# Patient Record
Sex: FEMALE | Race: WHITE | Employment: OTHER | URBAN - METROPOLITAN AREA
[De-identification: names, ages, dates, MRNs, and addresses within clinical notes are randomized per-mention and may not be internally consistent; named-entity substitution may affect disease eponyms.]

---

## 2017-01-01 ENCOUNTER — OFFICE VISIT (OUTPATIENT)
Dept: FAMILY MEDICINE CLINIC | Age: 82
End: 2017-01-01

## 2017-01-01 ENCOUNTER — TELEPHONE (OUTPATIENT)
Dept: FAMILY MEDICINE CLINIC | Age: 82
End: 2017-01-01

## 2017-01-01 VITALS
HEART RATE: 90 BPM | SYSTOLIC BLOOD PRESSURE: 108 MMHG | WEIGHT: 150 LBS | TEMPERATURE: 97.5 F | BODY MASS INDEX: 22.73 KG/M2 | HEIGHT: 68 IN | DIASTOLIC BLOOD PRESSURE: 68 MMHG

## 2017-01-01 VITALS
RESPIRATION RATE: 16 BRPM | DIASTOLIC BLOOD PRESSURE: 78 MMHG | HEIGHT: 68 IN | SYSTOLIC BLOOD PRESSURE: 132 MMHG | TEMPERATURE: 97.5 F | HEART RATE: 72 BPM | BODY MASS INDEX: 23.64 KG/M2 | WEIGHT: 156 LBS

## 2017-01-01 VITALS
SYSTOLIC BLOOD PRESSURE: 108 MMHG | DIASTOLIC BLOOD PRESSURE: 68 MMHG | HEIGHT: 68 IN | TEMPERATURE: 97.5 F | HEART RATE: 68 BPM

## 2017-01-01 DIAGNOSIS — Z23 INFLUENZA VACCINE NEEDED: ICD-10-CM

## 2017-01-01 DIAGNOSIS — Z98.890 HX OF CRANIOTOMY: Primary | ICD-10-CM

## 2017-01-01 DIAGNOSIS — Z09 HOSPITAL DISCHARGE FOLLOW-UP: ICD-10-CM

## 2017-01-01 DIAGNOSIS — L98.9 SCALP LESION: ICD-10-CM

## 2017-01-01 DIAGNOSIS — Z98.890 HX OF CRANIOTOMY: ICD-10-CM

## 2017-01-01 DIAGNOSIS — E78.5 HYPERLIPIDEMIA: ICD-10-CM

## 2017-01-01 DIAGNOSIS — R33.9 URINARY RETENTION: Primary | ICD-10-CM

## 2017-01-01 DIAGNOSIS — L98.9 SCALP LESION: Primary | ICD-10-CM

## 2017-01-01 DIAGNOSIS — Z23 NEED FOR PNEUMOCOCCAL VACCINATION: ICD-10-CM

## 2017-01-01 DIAGNOSIS — I48.19 PERSISTENT ATRIAL FIBRILLATION (HCC): ICD-10-CM

## 2017-01-01 DIAGNOSIS — I10 BENIGN ESSENTIAL HTN: ICD-10-CM

## 2017-01-01 LAB
ANAEROBIC CULTURE: ABNORMAL
BILIRUBIN, POC: NORMAL
BLOOD URINE, POC: 25
CLARITY, POC: NORMAL
COLOR, POC: NORMAL
GLUCOSE URINE, POC: NORMAL
GRAM STAIN RESULT: ABNORMAL
KETONES, POC: NORMAL
LEUKOCYTE EST, POC: 125
NITRITE, POC: NORMAL
ORGANISM: ABNORMAL
PH, POC: 6
PROTEIN, POC: 15
SPECIFIC GRAVITY, POC: 1.02
UROBILINOGEN, POC: 0.2
WOUND/ABSCESS: ABNORMAL

## 2017-01-01 PROCEDURE — 81003 URINALYSIS AUTO W/O SCOPE: CPT | Performed by: FAMILY MEDICINE

## 2017-01-01 PROCEDURE — 99213 OFFICE O/P EST LOW 20 MIN: CPT | Performed by: FAMILY MEDICINE

## 2017-01-01 PROCEDURE — 90662 IIV NO PRSV INCREASED AG IM: CPT | Performed by: FAMILY MEDICINE

## 2017-01-01 PROCEDURE — 99214 OFFICE O/P EST MOD 30 MIN: CPT | Performed by: FAMILY MEDICINE

## 2017-01-01 PROCEDURE — G0008 ADMIN INFLUENZA VIRUS VAC: HCPCS | Performed by: FAMILY MEDICINE

## 2017-01-01 RX ORDER — LEVOTHYROXINE SODIUM 0.1 MG/1
TABLET ORAL
Qty: 90 TABLET | Refills: 1 | Status: SHIPPED | OUTPATIENT
Start: 2017-01-01

## 2017-01-01 RX ORDER — LEVOTHYROXINE SODIUM 0.07 MG/1
75 TABLET ORAL DAILY
Qty: 90 TABLET | Refills: 3 | Status: SHIPPED | OUTPATIENT
Start: 2017-01-01 | End: 2018-01-01 | Stop reason: SDUPTHER

## 2017-01-01 RX ORDER — SIMVASTATIN 20 MG
TABLET ORAL
Qty: 90 TABLET | Refills: 1 | Status: SHIPPED | OUTPATIENT
Start: 2017-01-01 | End: 2018-01-01 | Stop reason: SDUPTHER

## 2017-01-01 RX ORDER — METOPROLOL TARTRATE 50 MG/1
TABLET, FILM COATED ORAL
Qty: 180 TABLET | Refills: 1 | Status: SHIPPED | OUTPATIENT
Start: 2017-01-01 | End: 2018-01-01 | Stop reason: SDUPTHER

## 2017-01-01 RX ORDER — LEVOTHYROXINE SODIUM 0.07 MG/1
75 TABLET ORAL DAILY
Qty: 30 TABLET | Refills: 3 | Status: SHIPPED | OUTPATIENT
Start: 2017-01-01 | End: 2017-01-01 | Stop reason: SDUPTHER

## 2017-01-01 RX ORDER — LEVETIRACETAM 500 MG/1
TABLET ORAL
Qty: 180 TABLET | Refills: 1 | Status: SHIPPED | OUTPATIENT
Start: 2017-01-01 | End: 2018-01-01 | Stop reason: SDUPTHER

## 2017-01-01 RX ORDER — MIRTAZAPINE 15 MG/1
TABLET, FILM COATED ORAL
Qty: 90 TABLET | Refills: 1 | Status: SHIPPED | OUTPATIENT
Start: 2017-01-01 | End: 2018-01-01 | Stop reason: SDUPTHER

## 2017-01-01 RX ORDER — MIRTAZAPINE 15 MG/1
TABLET, FILM COATED ORAL
Qty: 90 TABLET | Refills: 0 | Status: SHIPPED | OUTPATIENT
Start: 2017-01-01 | End: 2018-01-01 | Stop reason: SDUPTHER

## 2017-01-01 RX ORDER — FUROSEMIDE 20 MG/1
TABLET ORAL
Qty: 360 TABLET | Refills: 0 | Status: SHIPPED | OUTPATIENT
Start: 2017-01-01 | End: 2018-01-01 | Stop reason: SDUPTHER

## 2017-01-01 RX ORDER — SULFAMETHOXAZOLE AND TRIMETHOPRIM 800; 160 MG/1; MG/1
1 TABLET ORAL 2 TIMES DAILY
Qty: 20 TABLET | Refills: 0 | Status: SHIPPED | OUTPATIENT
Start: 2017-01-01 | End: 2017-01-01

## 2017-01-01 ASSESSMENT — ENCOUNTER SYMPTOMS
SORE THROAT: 0
SINUS PRESSURE: 0
CONSTIPATION: 0
CONSTIPATION: 0
DIARRHEA: 0
SHORTNESS OF BREATH: 0
EYE DISCHARGE: 0
DIARRHEA: 0
SINUS PRESSURE: 0
DIARRHEA: 0
ABDOMINAL PAIN: 0
SORE THROAT: 0
SHORTNESS OF BREATH: 1
SINUS PRESSURE: 0
EYE DISCHARGE: 0
EYE ITCHING: 0
CONSTIPATION: 0
EYE ITCHING: 0
EYE ITCHING: 0
SHORTNESS OF BREATH: 1
SORE THROAT: 0
COUGH: 0
EYE DISCHARGE: 0

## 2017-01-24 RX ORDER — MIRTAZAPINE 15 MG/1
15 TABLET, FILM COATED ORAL NIGHTLY
Qty: 90 TABLET | Refills: 0 | Status: SHIPPED | OUTPATIENT
Start: 2017-01-24 | End: 2017-05-01 | Stop reason: SDUPTHER

## 2017-03-05 DIAGNOSIS — I10 BENIGN ESSENTIAL HTN: ICD-10-CM

## 2017-03-06 RX ORDER — POTASSIUM CHLORIDE 750 MG/1
TABLET, FILM COATED, EXTENDED RELEASE ORAL
Qty: 180 TABLET | Refills: 0 | Status: SHIPPED | OUTPATIENT
Start: 2017-03-06 | End: 2017-06-04 | Stop reason: SDUPTHER

## 2017-03-07 RX ORDER — LEVETIRACETAM 500 MG/1
500 TABLET ORAL 2 TIMES DAILY
Qty: 60 TABLET | Refills: 1 | Status: SHIPPED | OUTPATIENT
Start: 2017-03-07 | End: 2017-05-02 | Stop reason: SDUPTHER

## 2017-05-01 RX ORDER — MIRTAZAPINE 15 MG/1
TABLET, FILM COATED ORAL
Qty: 90 TABLET | Refills: 1 | Status: SHIPPED | OUTPATIENT
Start: 2017-05-01

## 2017-05-02 RX ORDER — LEVETIRACETAM 500 MG/1
TABLET ORAL
Qty: 60 TABLET | Refills: 2 | Status: SHIPPED | OUTPATIENT
Start: 2017-05-02

## 2017-05-25 RX ORDER — LEVOTHYROXINE SODIUM 0.1 MG/1
TABLET ORAL
Qty: 90 TABLET | Refills: 0 | Status: SHIPPED | OUTPATIENT
Start: 2017-05-25 | End: 2017-07-24

## 2017-06-04 DIAGNOSIS — I10 BENIGN ESSENTIAL HTN: ICD-10-CM

## 2017-06-05 RX ORDER — POTASSIUM CHLORIDE 750 MG/1
TABLET, FILM COATED, EXTENDED RELEASE ORAL
Qty: 60 TABLET | Refills: 0 | Status: SHIPPED | OUTPATIENT
Start: 2017-06-05 | End: 2017-07-14 | Stop reason: SDUPTHER

## 2017-07-14 DIAGNOSIS — I10 BENIGN ESSENTIAL HTN: ICD-10-CM

## 2017-07-17 RX ORDER — POTASSIUM CHLORIDE 750 MG/1
TABLET, FILM COATED, EXTENDED RELEASE ORAL
Qty: 180 TABLET | Refills: 1 | Status: SHIPPED | OUTPATIENT
Start: 2017-07-17 | End: 2018-01-01 | Stop reason: SDUPTHER

## 2017-07-24 ENCOUNTER — OFFICE VISIT (OUTPATIENT)
Dept: FAMILY MEDICINE CLINIC | Age: 82
End: 2017-07-24

## 2017-07-24 VITALS
BODY MASS INDEX: 22.58 KG/M2 | TEMPERATURE: 97.6 F | DIASTOLIC BLOOD PRESSURE: 68 MMHG | SYSTOLIC BLOOD PRESSURE: 124 MMHG | HEART RATE: 56 BPM | HEIGHT: 68 IN | WEIGHT: 149 LBS

## 2017-07-24 DIAGNOSIS — H93.8X3 EAR FULLNESS, BILATERAL: Primary | ICD-10-CM

## 2017-07-24 DIAGNOSIS — I10 BENIGN ESSENTIAL HTN: ICD-10-CM

## 2017-07-24 DIAGNOSIS — I50.22 CHRONIC SYSTOLIC CONGESTIVE HEART FAILURE (HCC): ICD-10-CM

## 2017-07-24 DIAGNOSIS — I48.91 ATRIAL FIBRILLATION WITH RAPID VENTRICULAR RESPONSE (HCC): ICD-10-CM

## 2017-07-24 DIAGNOSIS — E78.5 HYPERLIPIDEMIA, UNSPECIFIED HYPERLIPIDEMIA TYPE: ICD-10-CM

## 2017-07-24 LAB
ALBUMIN SERPL-MCNC: 3.5 G/DL (ref 3.9–4.9)
ALP BLD-CCNC: 83 U/L (ref 40–130)
ALT SERPL-CCNC: 7 U/L (ref 0–33)
ANION GAP SERPL CALCULATED.3IONS-SCNC: 15 MEQ/L (ref 7–13)
AST SERPL-CCNC: 16 U/L (ref 0–35)
BASOPHILS ABSOLUTE: 0 K/UL (ref 0–0.2)
BASOPHILS RELATIVE PERCENT: 0.6 %
BILIRUB SERPL-MCNC: 0.7 MG/DL (ref 0–1.2)
BUN BLDV-MCNC: 18 MG/DL (ref 8–23)
CALCIUM SERPL-MCNC: 8.9 MG/DL (ref 8.6–10.2)
CHLORIDE BLD-SCNC: 96 MEQ/L (ref 98–107)
CHOLESTEROL, TOTAL: 104 MG/DL (ref 0–199)
CO2: 29 MEQ/L (ref 22–29)
CREAT SERPL-MCNC: 1.25 MG/DL (ref 0.5–0.9)
EOSINOPHILS ABSOLUTE: 0.1 K/UL (ref 0–0.7)
EOSINOPHILS RELATIVE PERCENT: 1.9 %
GFR AFRICAN AMERICAN: 48.8
GFR NON-AFRICAN AMERICAN: 40.3
GLOBULIN: 4 G/DL (ref 2.3–3.5)
GLUCOSE BLD-MCNC: 97 MG/DL (ref 74–109)
HCT VFR BLD CALC: 43.2 % (ref 37–47)
HDLC SERPL-MCNC: 39 MG/DL (ref 40–59)
HEMOGLOBIN: 13.9 G/DL (ref 12–16)
HYPOCHROMIA: ABNORMAL
LDL CHOLESTEROL CALCULATED: 39 MG/DL (ref 0–129)
LYMPHOCYTES ABSOLUTE: 1.1 K/UL (ref 1–4.8)
LYMPHOCYTES RELATIVE PERCENT: 16.9 %
MACROCYTES: ABNORMAL
MCH RBC QN AUTO: 34.8 PG (ref 27–31.3)
MCHC RBC AUTO-ENTMCNC: 32.1 % (ref 33–37)
MCV RBC AUTO: 108.4 FL (ref 82–100)
MONOCYTES ABSOLUTE: 0.8 K/UL (ref 0.2–0.8)
MONOCYTES RELATIVE PERCENT: 12.9 %
NEUTROPHILS ABSOLUTE: 4.3 K/UL (ref 1.4–6.5)
NEUTROPHILS RELATIVE PERCENT: 67.7 %
PDW BLD-RTO: 15.1 % (ref 11.5–14.5)
PLATELET # BLD: 219 K/UL (ref 130–400)
PLATELET SLIDE REVIEW: NORMAL
POTASSIUM SERPL-SCNC: 4 MEQ/L (ref 3.5–5.1)
RBC # BLD: 3.99 M/UL (ref 4.2–5.4)
SODIUM BLD-SCNC: 140 MEQ/L (ref 132–144)
T4 FREE: 1.73 NG/DL (ref 0.93–1.7)
TOTAL PROTEIN: 7.5 G/DL (ref 6.4–8.1)
TRIGL SERPL-MCNC: 131 MG/DL (ref 0–200)
WBC # BLD: 6.4 K/UL (ref 4.8–10.8)

## 2017-07-24 PROCEDURE — 99214 OFFICE O/P EST MOD 30 MIN: CPT | Performed by: FAMILY MEDICINE

## 2017-07-24 RX ORDER — LEVOTHYROXINE SODIUM 0.07 MG/1
75 TABLET ORAL DAILY
Qty: 30 TABLET | Refills: 3 | Status: SHIPPED | OUTPATIENT
Start: 2017-07-24 | End: 2017-01-01 | Stop reason: SDUPTHER

## 2017-07-24 ASSESSMENT — PATIENT HEALTH QUESTIONNAIRE - PHQ9
1. LITTLE INTEREST OR PLEASURE IN DOING THINGS: 0
SUM OF ALL RESPONSES TO PHQ9 QUESTIONS 1 & 2: 0
SUM OF ALL RESPONSES TO PHQ QUESTIONS 1-9: 0
2. FEELING DOWN, DEPRESSED OR HOPELESS: 0

## 2017-07-24 ASSESSMENT — ENCOUNTER SYMPTOMS
SINUS PRESSURE: 0
EYE ITCHING: 0
EYE DISCHARGE: 0
SORE THROAT: 0
COUGH: 0
DIARRHEA: 0
ABDOMINAL PAIN: 0
CONSTIPATION: 0

## 2017-07-25 ENCOUNTER — TELEPHONE (OUTPATIENT)
Dept: FAMILY MEDICINE CLINIC | Age: 82
End: 2017-07-25

## 2017-08-02 RX ORDER — LEVETIRACETAM 500 MG/1
TABLET ORAL
Qty: 180 TABLET | Refills: 0 | Status: SHIPPED | OUTPATIENT
Start: 2017-08-02 | End: 2017-01-01 | Stop reason: SDUPTHER

## 2017-10-11 NOTE — PROGRESS NOTES
Subjective  Dara Crane, 80 y.o. female presents today with:  Chief Complaint   Patient presents with    Wound Check     4 years ago she blood on her brain [facility] removed the blood. Now she is starting to notice tenderness in the area. She plascencia her hair and a \" little piece will chip off\"denies headache, nausea  and or vomiting. no drainage from the incison site is noted    Flu Vaccine           HPI    Patient in with complaints of crusting a prior craniotomy site on the scalp has been going on for a few weeks. Denies fever chills slightly tender with palpation. Assessment & Plan   1. Hx of craniotomy  XR SKULL (<4 VIEWS)    Anaerobic And Aerobic Culture    CANCELED: XR SKULL (<4 VIEWS)   2. Influenza vaccine needed  INFLUENZA, HIGH DOSE, 65 YRS +, IM, PF, PREFILL SYR, 0.5ML (FLUZONE HD)   3. Scalp lesion  XR SKULL (<4 VIEWS)    Anaerobic And Aerobic Culture    CANCELED: XR SKULL (<4 VIEWS)     Orders Placed This Encounter   Procedures    Anaerobic And Aerobic Culture     Standing Status:   Future     Standing Expiration Date:   10/11/2018    XR SKULL (<4 VIEWS)    INFLUENZA, HIGH DOSE, 65 YRS +, IM, PF, PREFILL SYR, 0.5ML (FLUZONE HD)     Orders Placed This Encounter   Medications    sulfamethoxazole-trimethoprim (BACTRIM DS) 800-160 MG per tablet     Sig: Take 1 tablet by mouth 2 times daily for 10 days     Dispense:  20 tablet     Refill:  0     Medications Discontinued During This Encounter   Medication Reason    levETIRAcetam (KEPPRA) 572 MG tablet Duplicate Order     Return in about 2 days (around 10/13/2017). Controlled Substances Monitoring:          Dave Kim MD              No other questions and or concerns for today's visit      Review of Systems   Constitutional: Negative for appetite change, fatigue and fever. HENT: Negative for congestion, ear pain, sinus pressure and sore throat. Eyes: Negative for discharge and itching.    Respiratory: Positive for shortness of breath (has oxygen). Negative for cough. Cardiovascular: Negative for chest pain and palpitations. Gastrointestinal: Negative for abdominal pain, constipation and diarrhea. Endocrine: Negative for polydipsia. Genitourinary: Negative for difficulty urinating. Musculoskeletal: Positive for arthralgias, gait problem (uses the wheelchair) and myalgias. Skin: Negative. Neurological: Negative for dizziness. Hematological: Negative. Psychiatric/Behavioral: Negative. Past Medical History:   Diagnosis Date    Generalized anxiety disorder     Mirtazipine 7.5 mg qhs    Screening for colon cancer     States had a colonoscopy about 8 years ago. And everything was fine. Had it done at [address] [place] [address] Sleep disorder     2013     Vitamin B12 deficiency     2013      Past Surgical History:   Procedure Laterality Date     SECTION      CRANIOTOMY      2014      Social History     Social History    Marital status: Single     Spouse name: N/A    Number of children: N/A    Years of education: N/A     Occupational History    Not on file.      Social History Main Topics    Smoking status: Former Smoker    Smokeless tobacco: Never Used    Alcohol use No    Drug use: No    Sexual activity: Not Currently     Other Topics Concern    Not on file     Social History Narrative    No narrative on file     Family History   Problem Relation Age of Onset    Colon Cancer Father      Allergies   Allergen Reactions    Naproxen Hives    Sulindac Hives    Lovastatin Itching     Current Outpatient Prescriptions   Medication Sig Dispense Refill    simvastatin (ZOCOR) 20 MG tablet TAKE 1 TABLET BY MOUTH EVERY DAY FOR CHOLESTEROL 90 tablet 1    furosemide (LASIX) 20 MG tablet TAKE 2 TABLETS BY MOUTH TWICE DAILY 360 tablet 0    potassium chloride (KLOR-CON) 10 MEQ extended release tablet TAKE 1 TABLET BY MOUTH TWICE DAILY 180 tablet 1    levETIRAcetam (KEPPRA) 500 MG tablet TAKE 1 TABLET BY MOUTH TWICE DAILY 60 tablet 2    mirtazapine (REMERON) 15 MG tablet TAKE 1 TABLET BY MOUTH EVERY NIGHT 90 tablet 1    metoprolol tartrate (LOPRESSOR) 50 MG tablet Take 1 tablet by mouth 2 times daily 180 tablet 3    OXYGEN Inhale 2 L into the lungs continuous      nitroGLYCERIN (NITROSTAT) 0.4 MG SL tablet Place 0.4 mg under the tongue      digoxin (LANOXIN) 125 MCG tablet Take 125 mcg by mouth      docusate sodium (COLACE) 100 MG capsule 1 qd  0    levothyroxine (SYNTHROID) 100 MCG tablet TAKE 1 TABLET BY MOUTH DAILY 90 tablet 1    levothyroxine (SYNTHROID) 75 MCG tablet Take 1 tablet by mouth Daily 30 tablet 3     No current facility-administered medications for this visit. PMH, Surgical Hx, Family Hx, and Social Hx reviewed and updated. Health Maintenance reviewed. Objective    Vitals:    10/11/17 1333   BP: 108/68   Pulse: 90   Temp: 97.5 °F (36.4 °C)   TempSrc: Temporal   Weight: 150 lb (68 kg)   Height: 5' 8\" (1.727 m)       Physical Exam   Constitutional: She is oriented to person, place, and time. She appears well-developed and well-nourished. HENT:   Head: Normocephalic. Mouth/Throat: Oropharynx is clear and moist.   Eyes: Pupils are equal, round, and reactive to light. Neck: Normal range of motion. Neck supple. No thyromegaly present. Cardiovascular: Normal rate and intact distal pulses. Exam reveals no gallop and no friction rub. No murmur heard. Pulmonary/Chest: Effort normal and breath sounds normal.   Abdominal: Soft. Bowel sounds are normal.   Musculoskeletal: Normal range of motion. Neurological: She is alert and oriented to person, place, and time. Skin: Skin is warm and dry. Psychiatric: She has a normal mood and affect.  Her behavior is normal.     Shows a craniotomy site still which showed deep difference in this gallop but just have some dry skin that debris was removed the central area of the skin is warm whether's approximately a centimeters and a half deep defect in the scalp does not appear to affect the bone there are bony plates there are the metal plate that's visual through this wound opening it is solid and in place. There is some purulent drainage on the crusted material was removed a wound culture was sent should be started on Bactrim DS twice a day skull x-ray does not show any obvious deep tissue involvement may need a CAT scan of the head.   The plan is to start the antibiotic and get her operative records and discussed this case with neurosurgery to see if they would be closing this at that form body has to be removed or if we need to send her to plastic surgeon will follow up with her in 48 hours regardless

## 2017-10-13 NOTE — PROGRESS NOTES
Subjective  Dara Juares, 80 y.o. female presents today with:  Chief Complaint   Patient presents with    Other     Follow up 10-11-17, scalp lesion on top of her head. Hx of craniotomy. Is taking Bactrim           HPI    Patient here for follow-up of scalp lesion. No other questions and or concerns for today's visit      Review of Systems   Constitutional: Negative for appetite change, fatigue and fever. HENT: Negative for congestion, ear pain, sinus pressure and sore throat. Eyes: Negative for discharge and itching. Respiratory: Negative for cough and shortness of breath. Cardiovascular: Negative for chest pain and palpitations. Gastrointestinal: Negative for abdominal pain, constipation and diarrhea. Endocrine: Negative for polydipsia. Genitourinary: Negative for difficulty urinating. Musculoskeletal: Positive for arthralgias and myalgias. Negative for gait problem. Skin: Positive for wound. Neurological: Negative for dizziness. Hematological: Negative. Psychiatric/Behavioral: Negative. Past Medical History:   Diagnosis Date    Generalized anxiety disorder     Mirtazipine 7.5 mg qhs    Screening for colon cancer     States had a colonoscopy about 8 years ago. And everything was fine. Had it done at [address] [place] [address] Sleep disorder     2013     Vitamin B12 deficiency     2013      Past Surgical History:   Procedure Laterality Date     SECTION      CRANIOTOMY      2014      Social History     Social History    Marital status: Single     Spouse name: N/A    Number of children: N/A    Years of education: N/A     Occupational History    Not on file.      Social History Main Topics    Smoking status: Former Smoker    Smokeless tobacco: Never Used    Alcohol use No    Drug use: No    Sexual activity: Not Currently     Other Topics Concern    Not on file     Social History Narrative    No narrative on file     Family History   Problem

## 2017-11-20 NOTE — PROGRESS NOTES
Not on file     Social History Narrative    No narrative on file     Family History   Problem Relation Age of Onset    Colon Cancer Father      Allergies   Allergen Reactions    Naproxen Hives    Sulindac Hives    Lovastatin Itching     Current Outpatient Prescriptions   Medication Sig Dispense Refill    mirtazapine (REMERON) 15 MG tablet TAKE 1 TABLET BY MOUTH EVERY NIGHT 90 tablet 0    simvastatin (ZOCOR) 20 MG tablet TAKE 1 TABLET BY MOUTH EVERY DAY FOR CHOLESTEROL 90 tablet 1    levothyroxine (SYNTHROID) 100 MCG tablet TAKE 1 TABLET BY MOUTH DAILY 90 tablet 1    furosemide (LASIX) 20 MG tablet TAKE 2 TABLETS BY MOUTH TWICE DAILY 360 tablet 0    levothyroxine (SYNTHROID) 75 MCG tablet Take 1 tablet by mouth Daily 30 tablet 3    potassium chloride (KLOR-CON) 10 MEQ extended release tablet TAKE 1 TABLET BY MOUTH TWICE DAILY 180 tablet 1    levETIRAcetam (KEPPRA) 500 MG tablet TAKE 1 TABLET BY MOUTH TWICE DAILY 60 tablet 2    mirtazapine (REMERON) 15 MG tablet TAKE 1 TABLET BY MOUTH EVERY NIGHT 90 tablet 1    metoprolol tartrate (LOPRESSOR) 50 MG tablet Take 1 tablet by mouth 2 times daily 180 tablet 3    OXYGEN Inhale 2 L into the lungs continuous      nitroGLYCERIN (NITROSTAT) 0.4 MG SL tablet Place 0.4 mg under the tongue      digoxin (LANOXIN) 125 MCG tablet Take 125 mcg by mouth      docusate sodium (COLACE) 100 MG capsule 1 qd  0     No current facility-administered medications for this visit. PMH, Surgical Hx, Family Hx, and Social Hx reviewed and updated. Health Maintenance reviewed. Objective    Vitals:    11/20/17 1323   BP: 132/78   Pulse: 72   Resp: 16   Temp: 97.5 °F (36.4 °C)   TempSrc: Temporal   Weight: 156 lb (70.8 kg)   Height: 5' 8\" (1.727 m)       Physical Exam   Constitutional: She is oriented to person, place, and time. She appears well-developed and well-nourished. HENT:   Head: Normocephalic.    Mouth/Throat: Oropharynx is clear and moist.   Eyes: Pupils are equal, Micro (Auto)   3. Hospital discharge follow-up     4. Need for pneumococcal vaccination       Orders Placed This Encounter   Procedures    POCT Urinalysis No Micro (Auto)     No orders of the defined types were placed in this encounter. There are no discontinued medications. No Follow-up on file.         Controlled Substances Monitoring:          Dash Daniels MD

## 2017-11-20 NOTE — TELEPHONE ENCOUNTER
Because of the potential urinary retention problem. We will be able to get her seen sooner than December 7?

## 2018-01-01 ENCOUNTER — OFFICE VISIT (OUTPATIENT)
Dept: CARDIOLOGY CLINIC | Age: 83
End: 2018-01-01

## 2018-01-01 ENCOUNTER — OFFICE VISIT (OUTPATIENT)
Dept: FAMILY MEDICINE CLINIC | Age: 83
End: 2018-01-01
Payer: MEDICARE

## 2018-01-01 ENCOUNTER — TELEPHONE (OUTPATIENT)
Dept: FAMILY MEDICINE CLINIC | Age: 83
End: 2018-01-01

## 2018-01-01 VITALS
DIASTOLIC BLOOD PRESSURE: 62 MMHG | HEART RATE: 88 BPM | HEIGHT: 68 IN | BODY MASS INDEX: 23.95 KG/M2 | RESPIRATION RATE: 16 BRPM | OXYGEN SATURATION: 95 % | WEIGHT: 158 LBS | SYSTOLIC BLOOD PRESSURE: 100 MMHG

## 2018-01-01 VITALS
RESPIRATION RATE: 12 BRPM | TEMPERATURE: 97.5 F | SYSTOLIC BLOOD PRESSURE: 92 MMHG | BODY MASS INDEX: 25.76 KG/M2 | OXYGEN SATURATION: 96 % | HEIGHT: 68 IN | WEIGHT: 170 LBS | DIASTOLIC BLOOD PRESSURE: 58 MMHG | HEART RATE: 72 BPM

## 2018-01-01 VITALS
DIASTOLIC BLOOD PRESSURE: 76 MMHG | HEIGHT: 68 IN | TEMPERATURE: 97.9 F | HEART RATE: 82 BPM | RESPIRATION RATE: 16 BRPM | SYSTOLIC BLOOD PRESSURE: 108 MMHG

## 2018-01-01 VITALS
SYSTOLIC BLOOD PRESSURE: 118 MMHG | RESPIRATION RATE: 16 BRPM | HEART RATE: 119 BPM | DIASTOLIC BLOOD PRESSURE: 66 MMHG | OXYGEN SATURATION: 90 %

## 2018-01-01 DIAGNOSIS — R41.0 CONFUSION: ICD-10-CM

## 2018-01-01 DIAGNOSIS — I50.22 CHRONIC SYSTOLIC CONGESTIVE HEART FAILURE (HCC): ICD-10-CM

## 2018-01-01 DIAGNOSIS — I10 BENIGN ESSENTIAL HTN: ICD-10-CM

## 2018-01-01 DIAGNOSIS — R05.9 COUGH: Primary | ICD-10-CM

## 2018-01-01 DIAGNOSIS — I50.9 CONGESTIVE HEART FAILURE, UNSPECIFIED HF CHRONICITY, UNSPECIFIED HEART FAILURE TYPE (HCC): ICD-10-CM

## 2018-01-01 DIAGNOSIS — F41.9 ANXIETY: ICD-10-CM

## 2018-01-01 DIAGNOSIS — E78.5 HYPERLIPIDEMIA, UNSPECIFIED HYPERLIPIDEMIA TYPE: Primary | ICD-10-CM

## 2018-01-01 DIAGNOSIS — R53.83 FATIGUE, UNSPECIFIED TYPE: ICD-10-CM

## 2018-01-01 DIAGNOSIS — E78.5 HYPERLIPIDEMIA: ICD-10-CM

## 2018-01-01 DIAGNOSIS — I48.91 ATRIAL FIBRILLATION WITH RAPID VENTRICULAR RESPONSE (HCC): ICD-10-CM

## 2018-01-01 DIAGNOSIS — E03.9 ACQUIRED HYPOTHYROIDISM: ICD-10-CM

## 2018-01-01 DIAGNOSIS — F41.1 GENERALIZED ANXIETY DISORDER: Primary | ICD-10-CM

## 2018-01-01 DIAGNOSIS — I48.91 ATRIAL FIBRILLATION WITH RAPID VENTRICULAR RESPONSE (HCC): Primary | ICD-10-CM

## 2018-01-01 DIAGNOSIS — I48.19 PERSISTENT ATRIAL FIBRILLATION (HCC): ICD-10-CM

## 2018-01-01 LAB
ALBUMIN SERPL-MCNC: 3.3 G/DL (ref 3.9–4.9)
ALP BLD-CCNC: 94 U/L (ref 40–130)
ALT SERPL-CCNC: 14 U/L (ref 0–33)
ANION GAP SERPL CALCULATED.3IONS-SCNC: 12 MEQ/L (ref 7–13)
AST SERPL-CCNC: 30 U/L (ref 0–35)
BASOPHILS ABSOLUTE: 0 K/UL (ref 0–0.2)
BASOPHILS RELATIVE PERCENT: 0.6 %
BILIRUB SERPL-MCNC: 0.5 MG/DL (ref 0–1.2)
BILIRUBIN, POC: NORMAL
BLOOD URINE, POC: NORMAL
BUN BLDV-MCNC: 12 MG/DL (ref 8–23)
CALCIUM SERPL-MCNC: 8.4 MG/DL (ref 8.6–10.2)
CHLORIDE BLD-SCNC: 97 MEQ/L (ref 98–107)
CHOLESTEROL, TOTAL: 100 MG/DL (ref 0–199)
CLARITY, POC: CLEAR
CO2: 32 MEQ/L (ref 22–29)
COLOR, POC: YELLOW
CREAT SERPL-MCNC: 1.3 MG/DL (ref 0.5–0.9)
EOSINOPHILS ABSOLUTE: 0.1 K/UL (ref 0–0.7)
EOSINOPHILS RELATIVE PERCENT: 0.8 %
GFR AFRICAN AMERICAN: 46.6
GFR NON-AFRICAN AMERICAN: 38.5
GLOBULIN: 3.8 G/DL (ref 2.3–3.5)
GLUCOSE BLD-MCNC: 117 MG/DL (ref 74–109)
GLUCOSE URINE, POC: NORMAL
HCT VFR BLD CALC: 39.1 % (ref 37–47)
HDLC SERPL-MCNC: 54 MG/DL (ref 40–59)
HEMOGLOBIN: 12.3 G/DL (ref 12–16)
KETONES, POC: NORMAL
LDL CHOLESTEROL CALCULATED: 25 MG/DL (ref 0–129)
LEUKOCYTE EST, POC: NORMAL
LYMPHOCYTES ABSOLUTE: 1.6 K/UL (ref 1–4.8)
LYMPHOCYTES RELATIVE PERCENT: 23.6 %
MCH RBC QN AUTO: 35.4 PG (ref 27–31.3)
MCHC RBC AUTO-ENTMCNC: 31.4 % (ref 33–37)
MCV RBC AUTO: 112.7 FL (ref 82–100)
MONOCYTES ABSOLUTE: 0.8 K/UL (ref 0.2–0.8)
MONOCYTES RELATIVE PERCENT: 12.7 %
NEUTROPHILS ABSOLUTE: 4.2 K/UL (ref 1.4–6.5)
NEUTROPHILS RELATIVE PERCENT: 62.3 %
NITRITE, POC: NORMAL
PDW BLD-RTO: 17.4 % (ref 11.5–14.5)
PH, POC: 6
PLATELET # BLD: 189 K/UL (ref 130–400)
POTASSIUM SERPL-SCNC: 4.3 MEQ/L (ref 3.5–5.1)
PROTEIN, POC: NORMAL
RBC # BLD: 3.47 M/UL (ref 4.2–5.4)
SODIUM BLD-SCNC: 141 MEQ/L (ref 132–144)
SPECIFIC GRAVITY, POC: 1.02
T4 FREE: 1.12 NG/DL (ref 0.93–1.7)
TOTAL PROTEIN: 7.1 G/DL (ref 6.4–8.1)
TRIGL SERPL-MCNC: 103 MG/DL (ref 0–200)
URINE CULTURE, ROUTINE: NORMAL
UROBILINOGEN, POC: NORMAL
WBC # BLD: 6.7 K/UL (ref 4.8–10.8)

## 2018-01-01 PROCEDURE — 99214 OFFICE O/P EST MOD 30 MIN: CPT | Performed by: INTERNAL MEDICINE

## 2018-01-01 PROCEDURE — 99213 OFFICE O/P EST LOW 20 MIN: CPT | Performed by: FAMILY MEDICINE

## 2018-01-01 PROCEDURE — 81003 URINALYSIS AUTO W/O SCOPE: CPT | Performed by: FAMILY MEDICINE

## 2018-01-01 PROCEDURE — 99214 OFFICE O/P EST MOD 30 MIN: CPT | Performed by: FAMILY MEDICINE

## 2018-01-01 RX ORDER — POTASSIUM CHLORIDE 750 MG/1
TABLET, FILM COATED, EXTENDED RELEASE ORAL
Qty: 180 TABLET | Refills: 0 | Status: SHIPPED | OUTPATIENT
Start: 2018-01-01 | End: 2018-01-01 | Stop reason: SDUPTHER

## 2018-01-01 RX ORDER — PAROXETINE 10 MG/1
10 TABLET, FILM COATED ORAL DAILY
Qty: 30 TABLET | Refills: 0 | Status: CANCELLED | OUTPATIENT
Start: 2018-01-01

## 2018-01-01 RX ORDER — PAROXETINE 10 MG/1
10 TABLET, FILM COATED ORAL DAILY
Qty: 30 TABLET | Refills: 3 | Status: SHIPPED | OUTPATIENT
Start: 2018-01-01 | End: 2018-01-01 | Stop reason: SDUPTHER

## 2018-01-01 RX ORDER — LORAZEPAM 0.5 MG/1
0.5 TABLET ORAL DAILY PRN
Qty: 30 TABLET | Refills: 1 | Status: SHIPPED | OUTPATIENT
Start: 2018-01-01 | End: 2018-01-01

## 2018-01-01 RX ORDER — LEVETIRACETAM 500 MG/1
TABLET ORAL
Qty: 180 TABLET | Refills: 0 | Status: SHIPPED | OUTPATIENT
Start: 2018-01-01 | End: 2018-01-01 | Stop reason: SDUPTHER

## 2018-01-01 RX ORDER — BENZONATATE 200 MG/1
200 CAPSULE ORAL 3 TIMES DAILY PRN
Qty: 30 CAPSULE | Refills: 1 | Status: SHIPPED | OUTPATIENT
Start: 2018-01-01 | End: 2018-01-01

## 2018-01-01 RX ORDER — MELATONIN: COMMUNITY

## 2018-01-01 RX ORDER — MIRTAZAPINE 15 MG/1
TABLET, FILM COATED ORAL
Qty: 90 TABLET | Refills: 2 | Status: SHIPPED | OUTPATIENT
Start: 2018-01-01 | End: 2018-01-01 | Stop reason: SDUPTHER

## 2018-01-01 RX ORDER — POTASSIUM CHLORIDE 750 MG/1
TABLET, FILM COATED, EXTENDED RELEASE ORAL
Qty: 180 TABLET | Refills: 0 | Status: SHIPPED | OUTPATIENT
Start: 2018-01-01

## 2018-01-01 RX ORDER — SIMVASTATIN 20 MG
TABLET ORAL
Qty: 90 TABLET | Refills: 0 | Status: SHIPPED | OUTPATIENT
Start: 2018-01-01

## 2018-01-01 RX ORDER — LEVOTHYROXINE SODIUM 0.07 MG/1
75 TABLET ORAL DAILY
Qty: 30 TABLET | Refills: 0 | Status: SHIPPED | OUTPATIENT
Start: 2018-01-01 | End: 2018-01-01 | Stop reason: DRUGHIGH

## 2018-01-01 RX ORDER — FUROSEMIDE 40 MG/1
TABLET ORAL
COMMUNITY

## 2018-01-01 RX ORDER — PAROXETINE 10 MG/1
10 TABLET, FILM COATED ORAL DAILY
Qty: 90 TABLET | Refills: 1 | Status: SHIPPED | OUTPATIENT
Start: 2018-01-01 | End: 2018-01-01 | Stop reason: ALTCHOICE

## 2018-01-01 RX ORDER — POTASSIUM CHLORIDE 750 MG/1
TABLET, EXTENDED RELEASE ORAL
COMMUNITY
End: 2018-01-01 | Stop reason: SDUPTHER

## 2018-01-01 RX ORDER — METOPROLOL TARTRATE 50 MG/1
TABLET, FILM COATED ORAL
Qty: 180 TABLET | Refills: 0 | Status: SHIPPED | OUTPATIENT
Start: 2018-01-01

## 2018-01-01 RX ORDER — FUROSEMIDE 20 MG/1
TABLET ORAL
Qty: 360 TABLET | Refills: 0 | Status: SHIPPED | OUTPATIENT
Start: 2018-01-01 | End: 2018-01-01 | Stop reason: DRUGHIGH

## 2018-01-01 RX ORDER — SPIRONOLACTONE 25 MG/1
TABLET ORAL
COMMUNITY
Start: 2018-01-01

## 2018-01-01 RX ORDER — LORAZEPAM 0.5 MG/1
0.5 TABLET ORAL EVERY 6 HOURS PRN
COMMUNITY

## 2018-01-01 RX ORDER — OMEGA-3S/DHA/EPA/FISH OIL/D3 300MG-1000
400 CAPSULE ORAL DAILY
COMMUNITY
End: 2018-01-01 | Stop reason: SDUPTHER

## 2018-01-01 ASSESSMENT — ENCOUNTER SYMPTOMS
COUGH: 0
APNEA: 0
EYE DISCHARGE: 0
SHORTNESS OF BREATH: 0
DIARRHEA: 0
SHORTNESS OF BREATH: 1
BLOOD IN STOOL: 0
SHORTNESS OF BREATH: 0
COUGH: 0
CONSTIPATION: 0
ABDOMINAL DISTENTION: 0
NAUSEA: 0
EYES NEGATIVE: 1
NAUSEA: 0
CONSTIPATION: 0
EYE ITCHING: 0
VOMITING: 0
COUGH: 0
DIARRHEA: 0
CHEST TIGHTNESS: 0
SORE THROAT: 0
EYES NEGATIVE: 1
ANAL BLEEDING: 0
NAUSEA: 0
ABDOMINAL PAIN: 0
ABDOMINAL PAIN: 0
DIARRHEA: 0
COUGH: 1
CONSTIPATION: 0
COLOR CHANGE: 0
SINUS PRESSURE: 0
ABDOMINAL PAIN: 0
DIARRHEA: 0
SHORTNESS OF BREATH: 1
ABDOMINAL PAIN: 0

## 2018-01-01 ASSESSMENT — PATIENT HEALTH QUESTIONNAIRE - PHQ9
SUM OF ALL RESPONSES TO PHQ9 QUESTIONS 1 & 2: 0
2. FEELING DOWN, DEPRESSED OR HOPELESS: 0
SUM OF ALL RESPONSES TO PHQ QUESTIONS 1-9: 0
1. LITTLE INTEREST OR PLEASURE IN DOING THINGS: 0